# Patient Record
Sex: MALE | Race: WHITE | NOT HISPANIC OR LATINO | URBAN - METROPOLITAN AREA
[De-identification: names, ages, dates, MRNs, and addresses within clinical notes are randomized per-mention and may not be internally consistent; named-entity substitution may affect disease eponyms.]

---

## 2018-04-10 PROBLEM — 70153002 HEMORRHOIDS: Status: ACTIVE | Noted: 2018-04-10

## 2018-04-10 PROBLEM — 266464001 HEMORRHAGE OF RECTUM AND ANUS: Status: ACTIVE | Noted: 2018-04-10

## 2018-04-10 PROBLEM — 33339001 PSORIASIS WITH ARTHROPATHY: Status: ACTIVE | Noted: 2018-04-10

## 2018-06-06 PROBLEM — 238131007 OVERWEIGHT: Status: ACTIVE | Noted: 2018-06-06

## 2021-11-18 PROBLEM — 428283002 HISTORY OF POLYP OF COLON: Status: ACTIVE | Noted: 2021-11-18

## 2021-12-27 ENCOUNTER — TELEPHONE ENCOUNTER (OUTPATIENT)
Dept: URBAN - METROPOLITAN AREA CLINIC 23 | Facility: CLINIC | Age: 61
End: 2021-12-27

## 2021-12-29 ENCOUNTER — OFFICE VISIT (OUTPATIENT)
Dept: URBAN - METROPOLITAN AREA SURGERY CENTER 13 | Facility: SURGERY CENTER | Age: 61
End: 2021-12-29
Payer: COMMERCIAL

## 2021-12-29 DIAGNOSIS — Z86.010 ADENOMAS PERSONAL HISTORY OF COLONIC POLYPS: ICD-10-CM

## 2021-12-29 PROCEDURE — G8907 PT DOC NO EVENTS ON DISCHARG: HCPCS | Performed by: INTERNAL MEDICINE

## 2021-12-29 PROCEDURE — G0105 COLORECTAL SCRN; HI RISK IND: HCPCS | Performed by: INTERNAL MEDICINE

## 2022-04-30 ENCOUNTER — TELEPHONE ENCOUNTER (OUTPATIENT)
Dept: URBAN - METROPOLITAN AREA CLINIC 121 | Facility: CLINIC | Age: 62
End: 2022-04-30

## 2022-04-30 RX ORDER — SODIUM SULFATE, POTASSIUM SULFATE, MAGNESIUM SULFATE 17.5; 3.13; 1.6 G/ML; G/ML; G/ML
MIX AND USE AS DIRECTED SOLUTION, CONCENTRATE ORAL
OUTPATIENT
Start: 2019-05-13

## 2022-04-30 RX ORDER — SODIUM SULFATE, POTASSIUM SULFATE, MAGNESIUM SULFATE 17.5; 3.13; 1.6 G/ML; G/ML; G/ML
MIX AND USE AS DIRECTED SOLUTION, CONCENTRATE ORAL
OUTPATIENT
Start: 2019-05-13 | End: 2019-05-15

## 2022-05-01 ENCOUNTER — TELEPHONE ENCOUNTER (OUTPATIENT)
Dept: URBAN - METROPOLITAN AREA CLINIC 121 | Facility: CLINIC | Age: 62
End: 2022-05-01

## 2022-05-01 RX ORDER — HYDROCORTISONE 25 MG/G
APPLY WITH GLOVED FINGER TWICE DAILY AS DIRECTED CREAM TOPICAL
Status: ACTIVE | COMMUNITY
Start: 2018-04-10

## 2022-05-01 RX ORDER — FOLIC ACID 1 MG/1
TABLET ORAL
Status: ACTIVE | COMMUNITY
Start: 2018-04-10

## 2022-05-01 RX ORDER — METHOTREXATE 2.5 MG/1
TABLET ORAL
Status: ACTIVE | COMMUNITY
Start: 2018-04-10

## 2024-07-12 ENCOUNTER — OFFICE VISIT (OUTPATIENT)
Dept: URBAN - METROPOLITAN AREA CLINIC 82 | Facility: CLINIC | Age: 64
End: 2024-07-12
Payer: COMMERCIAL

## 2024-07-12 ENCOUNTER — DASHBOARD ENCOUNTERS (OUTPATIENT)
Age: 64
End: 2024-07-12

## 2024-07-12 ENCOUNTER — TELEPHONE ENCOUNTER (OUTPATIENT)
Dept: URBAN - METROPOLITAN AREA CLINIC 82 | Facility: CLINIC | Age: 64
End: 2024-07-12

## 2024-07-12 VITALS
HEIGHT: 69 IN | HEART RATE: 98 BPM | BODY MASS INDEX: 26.63 KG/M2 | SYSTOLIC BLOOD PRESSURE: 131 MMHG | TEMPERATURE: 98 F | WEIGHT: 179.8 LBS | DIASTOLIC BLOOD PRESSURE: 66 MMHG

## 2024-07-12 DIAGNOSIS — K80.20 ASYMPTOMATIC CHOLELITHIASIS: ICD-10-CM

## 2024-07-12 DIAGNOSIS — R14.0 ABDOMINAL BLOATING: ICD-10-CM

## 2024-07-12 DIAGNOSIS — K57.30 ACQUIRED DIVERTICULOSIS OF COLON: ICD-10-CM

## 2024-07-12 DIAGNOSIS — K59.09 CHANGE IN BOWEL MOVEMENTS INTERMITTENT CONSTIPATION. URGENCY IN THE MORNING.: ICD-10-CM

## 2024-07-12 PROBLEM — 116289008: Status: ACTIVE | Noted: 2024-07-12

## 2024-07-12 PROBLEM — 397881000: Status: ACTIVE | Noted: 2024-07-12

## 2024-07-12 PROBLEM — 266474003: Status: ACTIVE | Noted: 2024-07-12

## 2024-07-12 PROBLEM — 197119006: Status: ACTIVE | Noted: 2024-07-12

## 2024-07-12 PROBLEM — 80301007: Status: ACTIVE | Noted: 2024-07-12

## 2024-07-12 PROCEDURE — 99204 OFFICE O/P NEW MOD 45 MIN: CPT | Performed by: STUDENT IN AN ORGANIZED HEALTH CARE EDUCATION/TRAINING PROGRAM

## 2024-07-12 RX ORDER — FOLIC ACID 1 MG/1
TABLET ORAL
Status: DISCONTINUED | COMMUNITY
Start: 2018-04-10

## 2024-07-12 RX ORDER — METHOTREXATE 2.5 MG/1
TABLET ORAL
Status: DISCONTINUED | COMMUNITY
Start: 2018-04-10

## 2024-07-12 RX ORDER — HYDROCORTISONE 25 MG/G
APPLY WITH GLOVED FINGER TWICE DAILY AS DIRECTED CREAM TOPICAL
Status: DISCONTINUED | COMMUNITY
Start: 2018-04-10

## 2024-07-12 RX ORDER — PREDNISONE 5 MG/1
TABLET ORAL
Qty: 0 | Refills: 0 | Status: DISCONTINUED | COMMUNITY
Start: 1900-01-01

## 2024-07-12 NOTE — HPI-TODAY'S VISIT:
63 y.o male presents today for c/o constipation, gas, bloating x 1 month. BM: 2x per day, hard stool. No rectal bleeds. No abd pain, NV. Wt stable. Patient tried probiotic for a few days, minimal relief. Taking fiber daily already. Tried liquid diet for 1 week, sx resolved but returned once he went back to his regular diet. Reports eating mostly home cooked meals. No fast food/process food. NO fhx of CRC. Last colonoscopy 2021, repeat in 5yrs.

## 2024-08-11 ENCOUNTER — LAB OUTSIDE AN ENCOUNTER (OUTPATIENT)
Dept: URBAN - METROPOLITAN AREA CLINIC 82 | Facility: CLINIC | Age: 64
End: 2024-08-11

## 2024-08-15 LAB
CALPROTECTIN, STOOL - QDX: (no result)
FECAL FAT, QUALITATIVE: (no result)
PANCREATICELASTASE ELISA, STOOL: (no result)

## 2024-08-23 ENCOUNTER — OFFICE VISIT (OUTPATIENT)
Dept: URBAN - METROPOLITAN AREA CLINIC 82 | Facility: CLINIC | Age: 64
End: 2024-08-23
Payer: COMMERCIAL

## 2024-08-23 VITALS
TEMPERATURE: 97.5 F | DIASTOLIC BLOOD PRESSURE: 71 MMHG | SYSTOLIC BLOOD PRESSURE: 114 MMHG | BODY MASS INDEX: 27.05 KG/M2 | HEART RATE: 80 BPM | WEIGHT: 182.6 LBS | HEIGHT: 69 IN

## 2024-08-23 DIAGNOSIS — R14.0 ABDOMINAL BLOATING: ICD-10-CM

## 2024-08-23 DIAGNOSIS — K59.09 CHANGE IN BOWEL MOVEMENTS INTERMITTENT CONSTIPATION. URGENCY IN THE MORNING.: ICD-10-CM

## 2024-08-23 PROCEDURE — 99212 OFFICE O/P EST SF 10 MIN: CPT | Performed by: INTERNAL MEDICINE

## 2024-08-23 NOTE — HPI-TODAY'S VISIT:
DOING GOOD  BM ALRIGHT, NOT CONSTIPATION  TRIED MIRALAX DAILY  TAKES BENEFIBER TWICE A DAY  BM BETTER, NOT TAKING MIRALAX DAILY  NO ABDOMINAL PAIN, NO BLEEDING P/R  2 MONTHS AGO, GAS, PASSING CRAZY, DID NOT PASS BM AS IT SHOULD

## 2024-09-05 ENCOUNTER — OFFICE VISIT (OUTPATIENT)
Dept: URBAN - METROPOLITAN AREA CLINIC 82 | Facility: CLINIC | Age: 64
End: 2024-09-05
Payer: COMMERCIAL

## 2024-09-05 ENCOUNTER — LAB OUTSIDE AN ENCOUNTER (OUTPATIENT)
Dept: URBAN - METROPOLITAN AREA CLINIC 82 | Facility: CLINIC | Age: 64
End: 2024-09-05

## 2024-09-05 VITALS
TEMPERATURE: 98.1 F | WEIGHT: 186 LBS | SYSTOLIC BLOOD PRESSURE: 123 MMHG | HEART RATE: 70 BPM | HEIGHT: 69 IN | DIASTOLIC BLOOD PRESSURE: 71 MMHG | BODY MASS INDEX: 27.55 KG/M2

## 2024-09-05 DIAGNOSIS — R10.30 LOWER ABDOMINAL PAIN: ICD-10-CM

## 2024-09-05 DIAGNOSIS — K57.92 DIVERTICULITIS: ICD-10-CM

## 2024-09-05 PROCEDURE — 99214 OFFICE O/P EST MOD 30 MIN: CPT | Performed by: INTERNAL MEDICINE

## 2024-09-05 NOTE — HPI-TODAY'S VISIT:
Colonoscopy '21 Dr. Mitchell. CT abd/pel 6/2024, thickening sig, likely due to hypertrophy vs less likely diverticulitis. Reports had abx.  GPP stool neg calpro/elastase. Nml BM. Had couple episodes of lower right abd pain, week ago, resolved.

## 2024-09-10 ENCOUNTER — OFFICE VISIT (OUTPATIENT)
Dept: URBAN - METROPOLITAN AREA SURGERY CENTER 13 | Facility: SURGERY CENTER | Age: 64
End: 2024-09-10

## 2024-09-26 ENCOUNTER — OFFICE VISIT (OUTPATIENT)
Dept: URBAN - METROPOLITAN AREA SURGERY CENTER 13 | Facility: SURGERY CENTER | Age: 64
End: 2024-09-26

## 2024-12-03 ENCOUNTER — OUT OF OFFICE VISIT (OUTPATIENT)
Dept: URBAN - METROPOLITAN AREA SURGERY CENTER 13 | Facility: SURGERY CENTER | Age: 64
End: 2024-12-03
Payer: COMMERCIAL

## 2024-12-03 ENCOUNTER — CLAIMS CREATED FROM THE CLAIM WINDOW (OUTPATIENT)
Dept: URBAN - METROPOLITAN AREA CLINIC 4 | Facility: CLINIC | Age: 64
End: 2024-12-03
Payer: COMMERCIAL

## 2024-12-03 DIAGNOSIS — D12.3 BENIGN NEOPLASM OF TRANSVERSE COLON: ICD-10-CM

## 2024-12-03 DIAGNOSIS — R10.31 ABDOMINAL PAIN, RIGHT LOWER QUADRANT: ICD-10-CM

## 2024-12-03 DIAGNOSIS — D12.8 ADENOMATOUS RECTAL POLYP: ICD-10-CM

## 2024-12-03 DIAGNOSIS — D12.3 ADENOMA OF TRANSVERSE COLON: ICD-10-CM

## 2024-12-03 DIAGNOSIS — K62.1 RECTAL POLYP: ICD-10-CM

## 2024-12-03 DIAGNOSIS — D12.8 BENIGN NEOPLASM OF RECTUM: ICD-10-CM

## 2024-12-03 PROCEDURE — 45385 COLONOSCOPY W/LESION REMOVAL: CPT | Performed by: INTERNAL MEDICINE

## 2024-12-03 PROCEDURE — 00811 ANES LWR INTST NDSC NOS: CPT | Performed by: ANESTHESIOLOGIST ASSISTANT

## 2024-12-03 PROCEDURE — 00811 ANES LWR INTST NDSC NOS: CPT | Performed by: CASE MANAGER/CARE COORDINATOR

## 2024-12-03 PROCEDURE — 88305 TISSUE EXAM BY PATHOLOGIST: CPT | Performed by: PATHOLOGY
